# Patient Record
(demographics unavailable — no encounter records)

---

## 2025-06-03 NOTE — HISTORY OF PRESENT ILLNESS
[FreeTextEntry1] :  51 year old female presents for fibroid consultation. Has known fibroids.. Denies any medical issues or past surgeries. H/o 2 prior NSVDs - denies issues with pregnancies. Denies h/o abnormal pap. Reports periods are very heavy and irregular. Notes period is heaviest for first 2 days. Denies family h/o colon, breast, uterine, or ovarian cancer. Pt is concerned about fibroid turning cancerous. Currently lives in [place]. Pt works in a school. Notes she is leaning more towards wanting partial hysterectomy.  willing to accept a blood transfusion saw Dr Castaneda where Wali was discussed  US pelvis  (main st radiology, to be scanned into chart)- 12cm uterus with 8cm myoma  Pobhx:  x2 Pgynhx: fibroids, no hx of abnormal paps, states periods q 2-3 months heavy 2 days last for 5 days  SocHx: works in a school, lives in [place] Allergies: NKDA no family hx of gyn cancers/vte/ breast cancer

## 2025-06-03 NOTE — END OF VISIT
[FreeTextEntry3] : I, Xi Schrader, acted as a scribe on behalf of Dr. Floresita Morales M.D. on 06/03/2025.   All medical entries made by the scribe were at my, Dr. Floresita Morales M.D., direction and personally dictated by me on 06/03/2025. I have reviewed the chart and agree that the record accurately reflects my personal performance of the history, physical exam, assessment and plan. I have also personally directed, reviewed, and agreed with the chart.

## 2025-06-03 NOTE — PHYSICAL EXAM
[Appropriately responsive] : appropriately responsive [Alert] : alert [No Acute Distress] : no acute distress [Oriented x3] : oriented x3 declines

## 2025-06-20 NOTE — PLAN
[FreeTextEntry1] : Discussed management of AUB at length, including medical, surgical options, [place] discussed.  Pt desires [place], pt hasnt spoken to IR yet, understands and given information states she will contact them. Understands needs EMB prior.  Floresita Morales MD

## 2025-06-20 NOTE — HISTORY OF PRESENT ILLNESS
[FreeTextEntry1] : Pt spoken to on phone today regarding fibroids, states that she desires [place], doesnt want a hysterectomy.  states she wants the [place]

## 2025-06-20 NOTE — REASON FOR VISIT
[Home] : at home, [unfilled] , at the time of the visit. [Technical] : patient unable to effectively utilize tele-video due to technical issues. [Verbal consent obtained from patient] : the patient, [unfilled] [Follow-Up] : a follow-up evaluation of [Telehealth (audio & video)] : This visit was provided via telehealth using real-time 2-way audio visual technology.

## 2025-06-24 NOTE — PROCEDURE
[Endometrial Biopsy] : Endometrial biopsy [Time out performed] : Pre-procedure time out performed.  Patient's name, date of birth and procedure confirmed. [Consent Obtained] : Consent obtained [Irregular Bleeding] : irregular uterine bleeding [Risks] : risks [Benefits] : benefits [Alternatives] : alternatives [Patient] : patient [Infection] : infection [Bleeding] : bleeding [Allergic Reaction] : allergic reaction [Uterine Perforation] : uterine perforation [Pain] : pain [Negative] : negative pregnancy test [Betadine] : Betadine [None] : none [Anteverted] : anteverted [Specimen Collected] : collected [Sent to Pathology] : placed in buffered formalin and sent for pathology [Tolerated Well] : Patient tolerated the procedure well [No Complications] : No complications [Easy Passage] : Easy passage [de-identified] : Allis

## 2025-06-24 NOTE — END OF VISIT
[FreeTextEntry3] : I, Katie Luis, acted as a scribe on behalf of Dr. Floresita Morales M.D. on 06/24/2025.   All medical entries made by the scribe were at my, Dr. Floresita Morales M.D. direction and personally dictated by me on 06/24/2025. I have reviewed the chart and agree that the record accurately reflects my personal performance of the history, physical exam, assessment and plan. I have also personally directed, reviewed, and agreed with the chart.

## 2025-06-24 NOTE — ASSESSMENT
[FreeTextEntry1] : EMB performed pt has IR follow up, geting MRI records to send to them discussed hysterectomy if not good candidate Floresita Morales MD 
You can access the [facility] Patient Portal, offered by [facility], by registering with the following website: [email or web address]